# Patient Record
Sex: MALE | Race: ASIAN | NOT HISPANIC OR LATINO | Employment: OTHER | URBAN - METROPOLITAN AREA
[De-identification: names, ages, dates, MRNs, and addresses within clinical notes are randomized per-mention and may not be internally consistent; named-entity substitution may affect disease eponyms.]

---

## 2022-01-01 ENCOUNTER — APPOINTMENT (OUTPATIENT)
Dept: RADIOLOGY | Facility: MEDICAL CENTER | Age: 72
End: 2022-01-01
Attending: STUDENT IN AN ORGANIZED HEALTH CARE EDUCATION/TRAINING PROGRAM
Payer: MEDICARE

## 2022-01-01 ENCOUNTER — HOSPITAL ENCOUNTER (EMERGENCY)
Facility: MEDICAL CENTER | Age: 72
End: 2022-08-26
Attending: STUDENT IN AN ORGANIZED HEALTH CARE EDUCATION/TRAINING PROGRAM
Payer: MEDICARE

## 2022-01-01 DIAGNOSIS — I46.9 CARDIOPULMONARY ARREST (HCC): ICD-10-CM

## 2022-01-01 DIAGNOSIS — I21.4 NSTEMI (NON-ST ELEVATED MYOCARDIAL INFARCTION) (HCC): ICD-10-CM

## 2022-01-01 DIAGNOSIS — R06.09 DYSPNEA ON EXERTION: ICD-10-CM

## 2022-01-01 DIAGNOSIS — K76.9 HEPATOCELLULAR INJURY: ICD-10-CM

## 2022-01-01 DIAGNOSIS — R79.89 ELEVATED BRAIN NATRIURETIC PEPTIDE (BNP) LEVEL: ICD-10-CM

## 2022-01-01 DIAGNOSIS — J96.22 ACUTE ON CHRONIC RESPIRATORY FAILURE WITH HYPOXIA AND HYPERCAPNIA (HCC): ICD-10-CM

## 2022-01-01 DIAGNOSIS — J96.21 ACUTE ON CHRONIC RESPIRATORY FAILURE WITH HYPOXIA AND HYPERCAPNIA (HCC): ICD-10-CM

## 2022-01-01 DIAGNOSIS — R79.89 ELEVATED TROPONIN: ICD-10-CM

## 2022-01-01 DIAGNOSIS — E83.52 HYPERCALCEMIA: ICD-10-CM

## 2022-01-01 LAB — GLUCOSE BLD STRIP.AUTO-MCNC: 122 MG/DL (ref 65–99)

## 2022-01-01 PROCEDURE — 93010 ELECTROCARDIOGRAM REPORT: CPT | Performed by: INTERNAL MEDICINE

## 2022-01-01 PROCEDURE — 93005 ELECTROCARDIOGRAM TRACING: CPT | Performed by: STUDENT IN AN ORGANIZED HEALTH CARE EDUCATION/TRAINING PROGRAM

## 2022-01-01 PROCEDURE — 85007 BL SMEAR W/DIFF WBC COUNT: CPT

## 2022-01-01 PROCEDURE — 82330 ASSAY OF CALCIUM: CPT

## 2022-01-01 PROCEDURE — 83880 ASSAY OF NATRIURETIC PEPTIDE: CPT

## 2022-01-01 PROCEDURE — 80053 COMPREHEN METABOLIC PANEL: CPT

## 2022-01-01 PROCEDURE — 82962 GLUCOSE BLOOD TEST: CPT

## 2022-01-01 PROCEDURE — 700101 HCHG RX REV CODE 250: Performed by: STUDENT IN AN ORGANIZED HEALTH CARE EDUCATION/TRAINING PROGRAM

## 2022-01-01 PROCEDURE — 37195 THROMBOLYTIC THERAPY STROKE: CPT

## 2022-01-01 PROCEDURE — 99291 CRITICAL CARE FIRST HOUR: CPT

## 2022-01-01 PROCEDURE — 92950 HEART/LUNG RESUSCITATION CPR: CPT

## 2022-01-01 PROCEDURE — 84484 ASSAY OF TROPONIN QUANT: CPT

## 2022-01-01 PROCEDURE — 36415 COLL VENOUS BLD VENIPUNCTURE: CPT

## 2022-01-01 PROCEDURE — 94799 UNLISTED PULMONARY SVC/PX: CPT

## 2022-01-01 PROCEDURE — 31500 INSERT EMERGENCY AIRWAY: CPT

## 2022-01-01 PROCEDURE — 85014 HEMATOCRIT: CPT | Mod: XU

## 2022-01-01 PROCEDURE — 302214 INTUBATION BOX: Performed by: STUDENT IN AN ORGANIZED HEALTH CARE EDUCATION/TRAINING PROGRAM

## 2022-01-01 PROCEDURE — 84132 ASSAY OF SERUM POTASSIUM: CPT | Mod: XU

## 2022-01-01 PROCEDURE — 700111 HCHG RX REV CODE 636 W/ 250 OVERRIDE (IP): Performed by: STUDENT IN AN ORGANIZED HEALTH CARE EDUCATION/TRAINING PROGRAM

## 2022-01-01 PROCEDURE — 82803 BLOOD GASES ANY COMBINATION: CPT

## 2022-01-01 PROCEDURE — 85025 COMPLETE CBC W/AUTO DIFF WBC: CPT

## 2022-01-01 PROCEDURE — 71045 X-RAY EXAM CHEST 1 VIEW: CPT

## 2022-01-01 PROCEDURE — 84295 ASSAY OF SERUM SODIUM: CPT | Mod: XU

## 2022-01-01 PROCEDURE — 93005 ELECTROCARDIOGRAM TRACING: CPT

## 2022-01-01 RX ORDER — CALCIUM CHLORIDE 100 MG/ML
INJECTION INTRAVENOUS; INTRAVENTRICULAR
Status: COMPLETED | OUTPATIENT
Start: 2022-01-01 | End: 2022-01-01

## 2022-01-01 RX ORDER — CALCIUM CHLORIDE 100 MG/ML
INJECTION INTRAVENOUS; INTRAVENTRICULAR
Status: DISCONTINUED
Start: 2022-01-01 | End: 2022-08-27 | Stop reason: HOSPADM

## 2022-01-01 RX ORDER — LISINOPRIL 20 MG/1
20 TABLET ORAL DAILY
COMMUNITY

## 2022-01-01 RX ORDER — AMIODARONE HYDROCHLORIDE 150 MG/3ML
INJECTION, SOLUTION INTRAVENOUS
Status: COMPLETED | OUTPATIENT
Start: 2022-01-01 | End: 2022-01-01

## 2022-01-01 RX ORDER — LIDOCAINE HYDROCHLORIDE 20 MG/ML
INJECTION, SOLUTION INTRAVENOUS
Status: COMPLETED | OUTPATIENT
Start: 2022-01-01 | End: 2022-01-01

## 2022-01-01 RX ORDER — EPINEPHRINE 0.1 MG/ML
SYRINGE (ML) INJECTION
Status: COMPLETED | OUTPATIENT
Start: 2022-01-01 | End: 2022-01-01

## 2022-01-01 RX ADMIN — AMIODARONE HYDROCHLORIDE 150 MG: 50 INJECTION, SOLUTION INTRAVENOUS at 23:28

## 2022-01-01 RX ADMIN — EPINEPHRINE 1 MG: 0.1 INJECTION, SOLUTION INTRAVENOUS at 23:06

## 2022-01-01 RX ADMIN — CALCIUM CHLORIDE 1 G: 100 INJECTION INTRAVENOUS; INTRAVENTRICULAR at 23:16

## 2022-01-01 RX ADMIN — LIDOCAINE HYDROCHLORIDE 100 MG: 20 INJECTION, SOLUTION INTRAVENOUS at 23:37

## 2022-01-01 RX ADMIN — EPINEPHRINE 1 MG: 0.1 INJECTION, SOLUTION INTRAVENOUS at 23:01

## 2022-01-01 RX ADMIN — EPINEPHRINE 1 MG: 0.1 INJECTION, SOLUTION INTRAVENOUS at 23:35

## 2022-01-01 RX ADMIN — LIDOCAINE HYDROCHLORIDE 100 MG: 20 INJECTION, SOLUTION INTRAVENOUS at 23:25

## 2022-01-01 RX ADMIN — SODIUM BICARBONATE 50 MEQ: 84 INJECTION, SOLUTION INTRAVENOUS at 23:33

## 2022-01-01 RX ADMIN — SODIUM BICARBONATE 50 MEQ: 84 INJECTION, SOLUTION INTRAVENOUS at 23:44

## 2022-01-01 RX ADMIN — AMIODARONE HYDROCHLORIDE 300 MG: 50 INJECTION, SOLUTION INTRAVENOUS at 23:24

## 2022-01-01 RX ADMIN — SODIUM BICARBONATE 50 MEQ: 84 INJECTION, SOLUTION INTRAVENOUS at 23:13

## 2022-01-01 RX ADMIN — EPINEPHRINE 1 MG: 0.1 INJECTION, SOLUTION INTRAVENOUS at 23:46

## 2022-01-01 RX ADMIN — EPINEPHRINE 1 MG: 0.1 INJECTION, SOLUTION INTRAVENOUS at 23:40

## 2022-01-01 RX ADMIN — EPINEPHRINE 1 MG: 0.1 INJECTION, SOLUTION INTRAVENOUS at 23:30

## 2022-01-01 RX ADMIN — EPINEPHRINE 1 MG: 0.1 INJECTION, SOLUTION INTRAVENOUS at 23:20

## 2022-01-01 RX ADMIN — EPINEPHRINE 1 MG: 0.1 INJECTION, SOLUTION INTRAVENOUS at 23:26

## 2022-01-01 RX ADMIN — CALCIUM CHLORIDE 1 G: 100 INJECTION INTRAVENOUS; INTRAVENTRICULAR at 23:06

## 2022-01-01 RX ADMIN — SODIUM BICARBONATE 50 MEQ: 84 INJECTION, SOLUTION INTRAVENOUS at 23:06

## 2022-01-01 RX ADMIN — EPINEPHRINE 1 MG: 0.1 INJECTION, SOLUTION INTRAVENOUS at 23:14

## 2022-01-01 RX ADMIN — EPINEPHRINE 1 MG: 0.1 INJECTION, SOLUTION INTRAVENOUS at 22:57

## 2022-08-27 VITALS
TEMPERATURE: 97.1 F | WEIGHT: 145 LBS | DIASTOLIC BLOOD PRESSURE: 81 MMHG | RESPIRATION RATE: 119 BRPM | SYSTOLIC BLOOD PRESSURE: 114 MMHG | HEIGHT: 66 IN | BODY MASS INDEX: 23.3 KG/M2 | OXYGEN SATURATION: 80 %

## 2022-08-27 LAB
ALBUMIN SERPL BCP-MCNC: 3.9 G/DL (ref 3.2–4.9)
ALBUMIN/GLOB SERPL: 1.6 G/DL
ALP SERPL-CCNC: 75 U/L (ref 30–99)
ALT SERPL-CCNC: 682 U/L (ref 2–50)
ANION GAP SERPL CALC-SCNC: 28 MMOL/L (ref 7–16)
ANISOCYTOSIS BLD QL SMEAR: ABNORMAL
AST SERPL-CCNC: 684 U/L (ref 12–45)
BASE EXCESS BLDA CALC-SCNC: -19 MMOL/L (ref -4–3)
BASE EXCESS BLDV CALC-SCNC: -13 MMOL/L (ref -4–3)
BASOPHILS # BLD AUTO: 0.8 % (ref 0–1.8)
BASOPHILS # BLD: 0.05 K/UL (ref 0–0.12)
BILIRUB SERPL-MCNC: 2.2 MG/DL (ref 0.1–1.5)
BODY TEMPERATURE: 36.2 CENTIGRADE
BODY TEMPERATURE: ABNORMAL DEGREES
BUN SERPL-MCNC: 36 MG/DL (ref 8–22)
BURR CELLS BLD QL SMEAR: NORMAL
CA-I BLD ISE-SCNC: 1.4 MMOL/L (ref 1.1–1.3)
CALCIUM SERPL-MCNC: 13.2 MG/DL (ref 8.5–10.5)
CHLORIDE SERPL-SCNC: 101 MMOL/L (ref 96–112)
CO2 BLDV-SCNC: 19 MMOL/L (ref 20–33)
CO2 SERPL-SCNC: 13 MMOL/L (ref 20–33)
CREAT SERPL-MCNC: 1.84 MG/DL (ref 0.5–1.4)
EKG IMPRESSION: NORMAL
EOSINOPHIL # BLD AUTO: 0.05 K/UL (ref 0–0.51)
EOSINOPHIL NFR BLD: 0.9 % (ref 0–6.9)
ERYTHROCYTE [DISTWIDTH] IN BLOOD BY AUTOMATED COUNT: 59.1 FL (ref 35.9–50)
GFR SERPLBLD CREATININE-BSD FMLA CKD-EPI: 38 ML/MIN/1.73 M 2
GLOBULIN SER CALC-MCNC: 2.4 G/DL (ref 1.9–3.5)
GLUCOSE SERPL-MCNC: 128 MG/DL (ref 65–99)
HCO3 BLDA-SCNC: 14 MMOL/L (ref 17–25)
HCO3 BLDV-SCNC: 17.4 MMOL/L (ref 24–28)
HCT VFR BLD AUTO: 46.3 % (ref 42–52)
HCT VFR BLD CALC: 42 % (ref 42–52)
HGB BLD-MCNC: 14.3 G/DL (ref 14–18)
HGB BLD-MCNC: 14.5 G/DL (ref 14–18)
LYMPHOCYTES # BLD AUTO: 4.13 K/UL (ref 1–4.8)
LYMPHOCYTES NFR BLD: 68.9 % (ref 22–41)
MACROCYTES BLD QL SMEAR: ABNORMAL
MANUAL DIFF BLD: NORMAL
MCH RBC QN AUTO: 33.1 PG (ref 27–33)
MCHC RBC AUTO-ENTMCNC: 31.3 G/DL (ref 33.7–35.3)
MCV RBC AUTO: 105.7 FL (ref 81.4–97.8)
METAMYELOCYTES NFR BLD MANUAL: 1.7 %
MONOCYTES # BLD AUTO: 0.25 K/UL (ref 0–0.85)
MONOCYTES NFR BLD AUTO: 4.2 % (ref 0–13.4)
MORPHOLOGY BLD-IMP: NORMAL
NEUTROPHILS # BLD AUTO: 1.41 K/UL (ref 1.82–7.42)
NEUTROPHILS NFR BLD: 23.5 % (ref 44–72)
NRBC # BLD AUTO: 0.06 K/UL
NRBC BLD-RTO: 1 /100 WBC
NT-PROBNP SERPL IA-MCNC: ABNORMAL PG/ML (ref 0–125)
OVALOCYTES BLD QL SMEAR: NORMAL
PCO2 BLDA: 68.4 MMHG (ref 26–37)
PCO2 BLDV: 60.2 MMHG (ref 41–51)
PH BLDA: 6.94 [PH] (ref 7.4–7.5)
PH BLDV: 7.07 [PH] (ref 7.31–7.45)
PLATELET # BLD AUTO: 56 K/UL (ref 164–446)
PLATELET BLD QL SMEAR: NORMAL
PMV BLD AUTO: 11.8 FL (ref 9–12.9)
PO2 BLDA: 28.8 MMHG (ref 64–87)
PO2 BLDV: 36 MMHG (ref 25–40)
POIKILOCYTOSIS BLD QL SMEAR: NORMAL
POTASSIUM BLD-SCNC: 7.2 MMOL/L (ref 3.6–5.5)
POTASSIUM SERPL-SCNC: 5.1 MMOL/L (ref 3.6–5.5)
PROT SERPL-MCNC: 6.3 G/DL (ref 6–8.2)
RBC # BLD AUTO: 4.38 M/UL (ref 4.7–6.1)
RBC BLD AUTO: PRESENT
SAO2 % BLDA: 21.7 % (ref 93–99)
SAO2 % BLDV: 46 %
SMUDGE CELLS BLD QL SMEAR: NORMAL
SODIUM BLD-SCNC: 138 MMOL/L (ref 135–145)
SODIUM SERPL-SCNC: 142 MMOL/L (ref 135–145)
SPECIMEN DRAWN FROM PATIENT: ABNORMAL
TROPONIN T SERPL-MCNC: 118 NG/L (ref 6–19)
WBC # BLD AUTO: 6 K/UL (ref 4.8–10.8)

## 2022-08-27 NOTE — ED NOTES
2 person body identification performed with Felix from traction. Pt transported to [facility].

## 2022-08-27 NOTE — ED NOTES
Pt wheeled to red 5 from the [employer]. Pt states he has been SOB for months now, approx 2-3 months. Pt is tachycardic at 126 and is speaking in full sentences but becomes more SOB on exertion.

## 2022-08-27 NOTE — ED NOTES
Pt son asked nurse for a blanket and that his dad couldn't breath.  This RN went into red 5 with Gail RN, pt was sitting upright, complaining of bilateral lower back pain. Pt's spo2 at this time was 98% on room air and he was tachycardic in 120's. Pt became restless and was putting his legs through the railing. This RN and Gail RN assisted pt back into bed. During this time, pt became unhooked from monitors. After pt was assisted back in bed pt became unresponsive. Dr. Yanes was called to room stat and the code blue button was pressed. Chest compressions were initiated.

## 2022-08-27 NOTE — PROGRESS NOTES
I was called to discuss this patients care with Dr. Yanes. We discussed EKG findings.    This is a 72-year-old who presented with shortness of breath for the last few months, who had a PEA arrest in the ER.  I was asked to evaluate the EKG with Dr. Yanes.  EKG shows sinus tachycardia with PVCs and poor R wave progression, no ST elevations.  Continue work-up as per ERP.  Please contact cardiology with any additional needs.    At this time it was deemed no formal in person cardiology consultation was necessary, however if this changes due to changes in patient condition or abnormal test results, please re-consult cardiology.    Electronically Signed by:    Sánchez Hahn M.D.    8/26/2022    11:18 PM

## 2022-08-27 NOTE — ED PROVIDER NOTES
ED Provider Note    CHIEF COMPLAINT  Chief Complaint   Patient presents with    Shortness of Breath     Worse with exertion       HPI  Josemanuel Freitas is a 72 y.o. male who presents with shortness of breath.  He states symptoms have been ongoing for a couple months but presents as it acutely worsened today.  He used to be able to walk 4 miles now says he is unable to walk about 20 steps.  He does not have any orthopnea or PND.  He states shortness of breath is worse with exertion.  He denies any chest pain or abdominal pain.  He does state that he has been nauseated and has had decreased p.o. intake as a result. He denies any vomiting. He is feeling increasing lethargic and weak over the past few months.     Patient denies any recent prolonged periods of immobilization (including hospitalization, long plane flight or car ride), no recent surgery, no recent traumatic injury, hemoptysis,  history of malignancy, DVT, PE.  He does smoke cigarettes.  Denies unilateral leg swelling.      Denies fevers or chills but has had intermittent cough.  Patient takes lisinopril for HTN    REVIEW OF SYSTEMS  As per HPI, otherwise a 10 point review of systems is negative    PAST MEDICAL HISTORY  History reviewed. No pertinent past medical history.  HTN    SOCIAL HISTORY  Social History     Tobacco Use    Smoking status: Every Day     Packs/day: 1.00     Types: Cigarettes    Smokeless tobacco: Never   Substance Use Topics    Alcohol use: Not Currently    Drug use: Not Currently       SURGICAL HISTORY  History reviewed. No pertinent surgical history.    CURRENT MEDICATIONS  Home Medications       Reviewed by Annelise Moore R.N. (Registered Nurse) on 08/26/22 at 2217  Med List Status: Not Addressed     Medication Last Dose Status   lisinopril (PRINIVIL) 20 MG Tab  Active                    ALLERGIES  No Known Allergies    PHYSICAL EXAM  VITAL SIGNS: /81   Pulse (!) 120   Temp 36.2 °C (97.1 °F) (Temporal)   Resp  "(!) 24   Ht 1.676 m (5' 6\")   Wt 65.8 kg (145 lb)   SpO2 (!) 52% Comment: WITH LMA DURING CPR  BMI 23.40 kg/m²    Constitutional: Awake and alert   HENT: Normal inspection  Eyes: Normal inspection  Neck: Grossly normal range of motion.  Cardiovascular: Tachycardic heart rate, Normal rhythm.  Symmetric peripheral pulses.   Thorax & Lungs: No respiratory distress but speaking in short sentences, No wheezing, No rales, No rhonchi, No chest tenderness. Diminished breath sounds bilateral lung bases  Abdomen: Soft, non-distended, nontender, no mass  Skin: No obvious rash.  Back: No tenderness, No CVA tenderness.   Extremities: Warm, well perfused. No clubbing, cyanosis, edema,   Neurologic: Grossly normal   Psychiatric: Normal for situation    RADIOLOGY/PROCEDURES  DX-CHEST-PORTABLE (1 VIEW)   Final Result         1.  No acute cardiopulmonary disease.   2.  Cardiomegaly           Imaging is interpreted by radiologist    Labs:  Results for orders placed or performed during the hospital encounter of 08/26/22   Comp Metabolic Panel   Result Value Ref Range    Sodium 142 135 - 145 mmol/L    Potassium 5.1 3.6 - 5.5 mmol/L    Chloride 101 96 - 112 mmol/L    Co2 13 (L) 20 - 33 mmol/L    Anion Gap 28.0 (H) 7.0 - 16.0    Glucose 128 (H) 65 - 99 mg/dL    Bun 36 (H) 8 - 22 mg/dL    Creatinine 1.84 (H) 0.50 - 1.40 mg/dL    Calcium 13.2 (HH) 8.5 - 10.5 mg/dL    AST(SGOT) 684 (H) 12 - 45 U/L    ALT(SGPT) 682 (H) 2 - 50 U/L    Alkaline Phosphatase 75 30 - 99 U/L    Total Bilirubin 2.2 (H) 0.1 - 1.5 mg/dL    Albumin 3.9 3.2 - 4.9 g/dL    Total Protein 6.3 6.0 - 8.2 g/dL    Globulin 2.4 1.9 - 3.5 g/dL    A-G Ratio 1.6 g/dL   TROPONIN   Result Value Ref Range    Troponin T 118 (H) 6 - 19 ng/L   proBrain Natriuretic Peptide, NT   Result Value Ref Range    NT-proBNP 88752 (H) 0 - 125 pg/mL   ABG - LAB   Result Value Ref Range    Ph 6.94 (LL) 7.40 - 7.50    Pco2 68.4 (HH) 26.0 - 37.0 mmHg    Po2 28.8 (LL) 64.0 - 87.0 mmHg    O2 Saturation " 21.7 (L) 93.0 - 99.0 %    Hco3 14 (L) 17 - 25 mmol/L    Base Excess -19 (L) -4 - 3 mmol/L    Body Temp 36.2 Centigrade   ESTIMATED GFR   Result Value Ref Range    GFR (CKD-EPI) 38 (A) >60 mL/min/1.73 m 2   EKG (NOW)   Result Value Ref Range    Report       [facility]    Test Date:  2022  Pt Name:    [facility]                Department: ER  MRN:        [id number]                      Room:  Gender:     Male                         Technician: [id number]  :        1950                   Requested By:ER TRIAGE PROTOCOL  Order #:    [id number]                    Reading MD:    Measurements  Intervals                                Axis  Rate:       125                          P:          0  IA:         128                          QRS:        37  QRSD:       102                          T:          186  QT:         285  QTc:        411    Interpretive Statements  Sinus tachycardia  Ventricular premature complex  Probable anterior infarct, age indeterminate  Lateral leads are also involved  No previous ECG available for comparison     POCT glucose device results   Result Value Ref Range    POC Glucose, Blood 122 (H) 65 - 99 mg/dL     The EKG was reviewed by me and interpreted as documented above      Medications   CALCIUM CHLORIDE 10 % IV SOLN (has no administration in time range)   alteplase (ACTIVASE) BOLUS injection 50 mg (has no administration in time range)     Followed by   alteplase (ACTIVASE) BOLUS injection 50 mg (has no administration in time range)   EPINEPHrine (Adrenaline) (ACLS IV PUSH DOSE) Syringe (1 mg Intraosseous Given 22)   calcium CHLORIDE injection (1 g Intravenous Given 226)   sodium bicarbonate 8.4 % injection (50 mEq Intravenous Given 224)   amiodarone (CORDARONE) 150 mg/3 mL injection (ACLS IV PUSH DOSE) (150 mg Intravenous Given 22)   Lidocaine HCl (Cardiac) PF SOSY (100 mg Intravenous Given 22)     Procedure:  Intubation  Performed by myself  Due to the patient’s clinical circumstances I elected to intubate the patient for airway protection, and respiratory failure . The patient was intubated without sedation or paralytics given he as already unconscious.  Using a GlideScope videolaryngoscope,  the patient was successfully intubated on the first attempt with a 7.0  tube. Tube placement was confirmed by end tidal CO2 and auscultation of breath sounds.  There were no desaturation events of apparent complications.  A CXR was not performed as the patient remained in cardiac arrest. The patient was bagged continuously after intubation.       Ultrasound  Cardiac: EF grossly reduced, grossly enlarged.  No effusion  +ve lung sliding on right and left pleural space  No free fluid in RUQ or LUQ  Unable to visualize aorta though no hematoma appreciated     COURSE & MEDICAL DECISION MAKING    This is a 72-year-old with hypertension who presents with acute on chronic dyspnea on exertion.  On arrival he is tachycardic in the 120s, speaking in short sentences but is satting at 100% on room air.  He is alert, oriented and uncomfortable appearing but not in distress.    Differential diagnoses include, but not limited to: pneumonia; URI (viral v. bacterial); COVID; pneumothorax; pleural effusion; pulmonary edema; pulmonary embolus; CHF; ACS; arrhythmia/block; myocarditis/pericarditis.     After my initial evaluation of the patient, my plan was for CXR, lab work including troponin, BNP, EKG and CTA of the chest to evaluate for pulmonary embolism among other acute cardiopulmonary process.    Prior to obtaining this work-up, I was called to the room as patient had suddenly become apneic.  Per report from the son and his bedside nurse he had complained of some lower back pain and was restless trying to get out of bed, stating he couldn't breath.  I immediately went into the room and found him pulseless.  I started bagging the patient and chest  compressions initiated immediately.  His initial rhythm was PEA and my initial view of the heart with bedside ultrasound did not reveal any appreciable cardiac activity.     I carefully considered all of the H's and T's to try and identify any reversible causes. He was not hypoxic, hypo/hyperthermic, hypoglycemic or hypovolemic. History and exam not suggestive of toxic etiology.  I did evaluate the heart with a bedside ultrasound and it appeared grossly enlarged without signs of pericardial effusion.  I did not see any findings to suggest acute aortic pathology (unable to clearly visualize the aorta) or any free fluid in the abdomen. There was adequate lung sliding.   Unfortunately, patient did not have IV access and there were delays in obtaining blood work.  Phlebotomy was paged immediately to assist and an IO was placed to right tibia.   Patient was resuscitated per ACLS with multiple doses of epinephrine and chest compressions with adequate end tidal CO2.     I did review his EKG which did not show a STEMI.  I called the cardiologist Dr. Hahn in order to have her also review the EKG in order to evaluate for signs of acute ischemia that could have caused his arrest and she agreed with my interpretation. He also had already had a chest x-ray completed which was notable for cardiomegaly but no other acute cardiopulmonary process to explain his arrest.    Highest on my differential at that time was a pulmonary embolism given his tachycardia and dyspnea on exertion.  Therefore, I decided to administer TPA however this did not result in return of spontaneous circulation.     Patient later transitioned into fine, coarse V. fib and I continued resuscitation per ACLS guidelines for VF arrest.  He was given multiple doses of epinephrine, calcium, bicarb, lidocaine, amiodarone and he was defibrillated multiple times. Please refer to nursing documentation for specifics.     Ultimately, we were able to get point-of-care  labs which were notable for hyperkalemia which I suspect might have been a hemolyzed sample as he had already received multiple doses of calcium and bicarb by that time.  He did have a significant acidosis likely due to organ failure in the setting of cardiopulmonary arrest, but again had received multiple doses of bicarb without ROSC.    After a prolonged resuscitation of approximately 1 hour, and discussion with the family, decision made to call time of death (23.53pm).  Repeat ultrasound did not reveal any meaningful cardiac activity.  Unfortunately it is not clear to me what the etiology of his arrest was.  His BNP later resulted significantly elevated, as did his troponin however I do not know the significance of these lab results after a prolonged cardiopulmonary arrest and extensive resuscitation.  I did offer an autopsy for further evaluation but no decision was made at that time.       FINAL IMPRESSION  1. Elevated troponin Acute       2. Dyspnea on exertion Acute       3. Cardiopulmonary arrest (HCC) Acute       4. NSTEMI (non-ST elevated myocardial infarction) (HCC) Acute       5. Elevated brain natriuretic peptide (BNP) level Acute       6. Acute on chronic respiratory failure with hypoxia and hypercapnia (HCC) Acute       7. Hypercalcemia Acute       8. Hepatocellular injury Acute           Critical care time : Mr. Freitas presents with an acute critical illness and in my judgement had significant potential for imminent deterioration. I provided 60 minutes of Critical Care time to this patient, exclusive of any separately billable procedures. This included bedside direction of care, frequent re-evaluations, time spent coordinating ongoing consultant care and time of medical documentation.  Additional critical needs were       This dictation was created using voice recognition software. The accuracy of the dictation is limited to the abilities of the software.  The nursing notes were reviewed and certain  aspects of this information were incorporated into this note.      Electronically signed by: Olimpia Yanes M.D., 8/26/2022 10:38 PM

## 2022-08-27 NOTE — CODE DOCUMENTATION
TOD 5721 by Dr. Yanes. Family at bedside requesting to stop resuscitative efforts. No carotid and no femoral pulse.

## 2022-08-27 NOTE — DISCHARGE PLANNING
Medical Social Work     Code Blue    MAXINE responded to a code blue. The pt came into the ER walking and taking and coded shortly after being in the room. The pt son was at bedside. SW provided emotional support, after an hour of CPR the pt son asked to stop CPR. The medical staff provided emotional support.     SW went over the difficult times packet and next steps, SW answered all questions.     The pt son is Tulio [phone]

## 2022-08-27 NOTE — ED TRIAGE NOTES
Chief Complaint   Patient presents with    Shortness of Breath     Worse with exertion     Pt wheeled to triage for above complaint. Pt reports also pain to his low back. Pt reports increase lethargy and weakness for awhile now.

## 2022-08-30 NOTE — DISCHARGE PLANNING
SW [facility]. She also wanted the phone number to help with the cremation costs. MAXINE gave the dtr the phone number to [facility] program.